# Patient Record
Sex: FEMALE | Race: WHITE | NOT HISPANIC OR LATINO | Employment: OTHER | URBAN - METROPOLITAN AREA
[De-identification: names, ages, dates, MRNs, and addresses within clinical notes are randomized per-mention and may not be internally consistent; named-entity substitution may affect disease eponyms.]

---

## 2022-03-23 PROBLEM — R29.6 RECURRENT FALLS: Status: ACTIVE | Noted: 2022-03-23

## 2022-06-14 PROBLEM — K40.90 LEFT INGUINAL HERNIA: Status: ACTIVE | Noted: 2022-01-01

## 2023-01-01 ENCOUNTER — OFFICE VISIT (OUTPATIENT)
Dept: CARDIOLOGY | Facility: CLINIC | Age: 88
End: 2023-01-01
Payer: MEDICARE

## 2023-01-01 ENCOUNTER — CLINICAL SUPPORT (OUTPATIENT)
Dept: CARDIOLOGY | Facility: HOSPITAL | Age: 88
End: 2023-01-01
Payer: MEDICARE

## 2023-01-01 ENCOUNTER — OFFICE VISIT (OUTPATIENT)
Dept: GASTROENTEROLOGY | Facility: CLINIC | Age: 88
End: 2023-01-01
Payer: MEDICARE

## 2023-01-01 ENCOUNTER — TELEPHONE (OUTPATIENT)
Dept: CARDIOLOGY | Facility: CLINIC | Age: 88
End: 2023-01-01
Payer: MEDICARE

## 2023-01-01 ENCOUNTER — PATIENT MESSAGE (OUTPATIENT)
Dept: CARDIOLOGY | Facility: CLINIC | Age: 88
End: 2023-01-01
Payer: MEDICARE

## 2023-01-01 VITALS — WEIGHT: 131.38 LBS | HEIGHT: 66 IN | BODY MASS INDEX: 21.11 KG/M2

## 2023-01-01 VITALS
HEIGHT: 66 IN | SYSTOLIC BLOOD PRESSURE: 111 MMHG | DIASTOLIC BLOOD PRESSURE: 71 MMHG | BODY MASS INDEX: 20.97 KG/M2 | WEIGHT: 130.5 LBS | HEART RATE: 68 BPM

## 2023-01-01 VITALS
SYSTOLIC BLOOD PRESSURE: 119 MMHG | BODY MASS INDEX: 20.13 KG/M2 | WEIGHT: 125.25 LBS | HEART RATE: 70 BPM | DIASTOLIC BLOOD PRESSURE: 80 MMHG | HEIGHT: 66 IN

## 2023-01-01 DIAGNOSIS — R93.3 ABNORMAL BARIUM SWALLOW: ICD-10-CM

## 2023-01-01 DIAGNOSIS — I27.20 PULMONARY HTN: ICD-10-CM

## 2023-01-01 DIAGNOSIS — Z79.01 CURRENT USE OF ANTICOAGULANT THERAPY: ICD-10-CM

## 2023-01-01 DIAGNOSIS — K21.9 GASTROESOPHAGEAL REFLUX DISEASE WITHOUT ESOPHAGITIS: ICD-10-CM

## 2023-01-01 DIAGNOSIS — F03.90 DEMENTIA, UNSPECIFIED DEMENTIA SEVERITY, UNSPECIFIED DEMENTIA TYPE, UNSPECIFIED WHETHER BEHAVIORAL, PSYCHOTIC, OR MOOD DISTURBANCE OR ANXIETY: ICD-10-CM

## 2023-01-01 DIAGNOSIS — R13.19 ESOPHAGEAL DYSPHAGIA: Primary | ICD-10-CM

## 2023-01-01 DIAGNOSIS — I48.91 ATRIAL FIBRILLATION WITH RAPID VENTRICULAR RESPONSE: Primary | ICD-10-CM

## 2023-01-01 DIAGNOSIS — I34.0 MITRAL VALVE INSUFFICIENCY, UNSPECIFIED ETIOLOGY: ICD-10-CM

## 2023-01-01 DIAGNOSIS — I34.1 MITRAL VALVE PROLAPSE: ICD-10-CM

## 2023-01-01 DIAGNOSIS — D69.6 THROMBOCYTOPENIA, UNSPECIFIED: ICD-10-CM

## 2023-01-01 DIAGNOSIS — R29.6 RECURRENT FALLS: ICD-10-CM

## 2023-01-01 DIAGNOSIS — I48.91 ATRIAL FIBRILLATION WITH RAPID VENTRICULAR RESPONSE: ICD-10-CM

## 2023-01-01 LAB
OHS CV EVENT MONITOR DAY: 0
OHS CV HOLTER LENGTH DECIMAL HOURS: 13.5
OHS CV HOLTER LENGTH HOURS: 13
OHS CV HOLTER LENGTH MINUTES: 30
OHS CV HOLTER SINUS AVERAGE HR: 77
OHS CV HOLTER SINUS MAX HR: 66
OHS CV HOLTER SINUS MIN HR: 61

## 2023-01-01 PROCEDURE — 3288F PR FALLS RISK ASSESSMENT DOCUMENTED: ICD-10-PCS | Mod: CPTII,S$GLB,, | Performed by: NURSE PRACTITIONER

## 2023-01-01 PROCEDURE — 3288F FALL RISK ASSESSMENT DOCD: CPT | Mod: CPTII,S$GLB,,

## 2023-01-01 PROCEDURE — 1100F PR PT FALLS ASSESS DOC 2+ FALLS/FALL W/INJURY/YR: ICD-10-PCS | Mod: CPTII,S$GLB,, | Performed by: NURSE PRACTITIONER

## 2023-01-01 PROCEDURE — 1160F PR REVIEW ALL MEDS BY PRESCRIBER/CLIN PHARMACIST DOCUMENTED: ICD-10-PCS | Mod: CPTII,S$GLB,, | Performed by: INTERNAL MEDICINE

## 2023-01-01 PROCEDURE — 1159F PR MEDICATION LIST DOCUMENTED IN MEDICAL RECORD: ICD-10-PCS | Mod: CPTII,S$GLB,,

## 2023-01-01 PROCEDURE — 1126F AMNT PAIN NOTED NONE PRSNT: CPT | Mod: CPTII,S$GLB,,

## 2023-01-01 PROCEDURE — 1126F AMNT PAIN NOTED NONE PRSNT: CPT | Mod: CPTII,S$GLB,, | Performed by: INTERNAL MEDICINE

## 2023-01-01 PROCEDURE — 99214 PR OFFICE/OUTPT VISIT, EST, LEVL IV, 30-39 MIN: ICD-10-PCS | Mod: S$GLB,,, | Performed by: INTERNAL MEDICINE

## 2023-01-01 PROCEDURE — 1111F DSCHRG MED/CURRENT MED MERGE: CPT | Mod: CPTII,S$GLB,,

## 2023-01-01 PROCEDURE — 93244 EXT ECG>48HR<7D REV&INTERPJ: CPT | Mod: ,,, | Performed by: INTERNAL MEDICINE

## 2023-01-01 PROCEDURE — 99214 PR OFFICE/OUTPT VISIT, EST, LEVL IV, 30-39 MIN: ICD-10-PCS | Mod: S$GLB,,, | Performed by: NURSE PRACTITIONER

## 2023-01-01 PROCEDURE — 99214 OFFICE O/P EST MOD 30 MIN: CPT | Mod: S$GLB,,, | Performed by: INTERNAL MEDICINE

## 2023-01-01 PROCEDURE — 1160F RVW MEDS BY RX/DR IN RCRD: CPT | Mod: CPTII,S$GLB,, | Performed by: NURSE PRACTITIONER

## 2023-01-01 PROCEDURE — 1126F PR PAIN SEVERITY QUANTIFIED, NO PAIN PRESENT: ICD-10-PCS | Mod: CPTII,S$GLB,, | Performed by: INTERNAL MEDICINE

## 2023-01-01 PROCEDURE — 1100F PTFALLS ASSESS-DOCD GE2>/YR: CPT | Mod: CPTII,S$GLB,, | Performed by: NURSE PRACTITIONER

## 2023-01-01 PROCEDURE — 93242 EXT ECG>48HR<7D RECORDING: CPT | Mod: PO

## 2023-01-01 PROCEDURE — 1159F PR MEDICATION LIST DOCUMENTED IN MEDICAL RECORD: ICD-10-PCS | Mod: CPTII,S$GLB,, | Performed by: INTERNAL MEDICINE

## 2023-01-01 PROCEDURE — 1126F PR PAIN SEVERITY QUANTIFIED, NO PAIN PRESENT: ICD-10-PCS | Mod: CPTII,S$GLB,,

## 2023-01-01 PROCEDURE — 99999 PR PBB SHADOW E&M-EST. PATIENT-LVL III: CPT | Mod: PBBFAC,,,

## 2023-01-01 PROCEDURE — 1100F PTFALLS ASSESS-DOCD GE2>/YR: CPT | Mod: CPTII,S$GLB,, | Performed by: INTERNAL MEDICINE

## 2023-01-01 PROCEDURE — 1100F PR PT FALLS ASSESS DOC 2+ FALLS/FALL W/INJURY/YR: ICD-10-PCS | Mod: CPTII,S$GLB,,

## 2023-01-01 PROCEDURE — 99999 PR PBB SHADOW E&M-EST. PATIENT-LVL III: ICD-10-PCS | Mod: PBBFAC,,, | Performed by: INTERNAL MEDICINE

## 2023-01-01 PROCEDURE — 1111F PR DISCHARGE MEDS RECONCILED W/ CURRENT OUTPATIENT MED LIST: ICD-10-PCS | Mod: CPTII,S$GLB,,

## 2023-01-01 PROCEDURE — 1159F PR MEDICATION LIST DOCUMENTED IN MEDICAL RECORD: ICD-10-PCS | Mod: CPTII,S$GLB,, | Performed by: NURSE PRACTITIONER

## 2023-01-01 PROCEDURE — 99214 OFFICE O/P EST MOD 30 MIN: CPT | Mod: S$GLB,,,

## 2023-01-01 PROCEDURE — 99214 PR OFFICE/OUTPT VISIT, EST, LEVL IV, 30-39 MIN: ICD-10-PCS | Mod: S$GLB,,,

## 2023-01-01 PROCEDURE — 1159F MED LIST DOCD IN RCRD: CPT | Mod: CPTII,S$GLB,, | Performed by: INTERNAL MEDICINE

## 2023-01-01 PROCEDURE — 3288F PR FALLS RISK ASSESSMENT DOCUMENTED: ICD-10-PCS | Mod: CPTII,S$GLB,, | Performed by: INTERNAL MEDICINE

## 2023-01-01 PROCEDURE — 1160F RVW MEDS BY RX/DR IN RCRD: CPT | Mod: CPTII,S$GLB,, | Performed by: INTERNAL MEDICINE

## 2023-01-01 PROCEDURE — 1160F PR REVIEW ALL MEDS BY PRESCRIBER/CLIN PHARMACIST DOCUMENTED: ICD-10-PCS | Mod: CPTII,S$GLB,, | Performed by: NURSE PRACTITIONER

## 2023-01-01 PROCEDURE — 3288F FALL RISK ASSESSMENT DOCD: CPT | Mod: CPTII,S$GLB,, | Performed by: NURSE PRACTITIONER

## 2023-01-01 PROCEDURE — 93244 HOLTER MONITOR - 72 HOUR: ICD-10-PCS | Mod: ,,, | Performed by: INTERNAL MEDICINE

## 2023-01-01 PROCEDURE — 99999 PR PBB SHADOW E&M-EST. PATIENT-LVL III: CPT | Mod: PBBFAC,,, | Performed by: INTERNAL MEDICINE

## 2023-01-01 PROCEDURE — 99999 PR PBB SHADOW E&M-EST. PATIENT-LVL III: ICD-10-PCS | Mod: PBBFAC,,,

## 2023-01-01 PROCEDURE — 99999 PR PBB SHADOW E&M-EST. PATIENT-LVL III: ICD-10-PCS | Mod: PBBFAC,,, | Performed by: NURSE PRACTITIONER

## 2023-01-01 PROCEDURE — 3288F PR FALLS RISK ASSESSMENT DOCUMENTED: ICD-10-PCS | Mod: CPTII,S$GLB,,

## 2023-01-01 PROCEDURE — 99999 PR PBB SHADOW E&M-EST. PATIENT-LVL III: CPT | Mod: PBBFAC,,, | Performed by: NURSE PRACTITIONER

## 2023-01-01 PROCEDURE — 1100F PTFALLS ASSESS-DOCD GE2>/YR: CPT | Mod: CPTII,S$GLB,,

## 2023-01-01 PROCEDURE — 1159F MED LIST DOCD IN RCRD: CPT | Mod: CPTII,S$GLB,,

## 2023-01-01 PROCEDURE — 99214 OFFICE O/P EST MOD 30 MIN: CPT | Mod: S$GLB,,, | Performed by: NURSE PRACTITIONER

## 2023-01-01 PROCEDURE — 1159F MED LIST DOCD IN RCRD: CPT | Mod: CPTII,S$GLB,, | Performed by: NURSE PRACTITIONER

## 2023-01-01 PROCEDURE — 3288F FALL RISK ASSESSMENT DOCD: CPT | Mod: CPTII,S$GLB,, | Performed by: INTERNAL MEDICINE

## 2023-01-01 PROCEDURE — 1100F PR PT FALLS ASSESS DOC 2+ FALLS/FALL W/INJURY/YR: ICD-10-PCS | Mod: CPTII,S$GLB,, | Performed by: INTERNAL MEDICINE

## 2023-01-01 RX ORDER — SUCRALFATE 1 G/1
1 TABLET ORAL 4 TIMES DAILY
Qty: 120 TABLET | Refills: 0 | Status: SHIPPED | OUTPATIENT
Start: 2023-01-01 | End: 2023-01-01

## 2023-01-01 RX ORDER — OMEPRAZOLE 40 MG/1
CAPSULE, DELAYED RELEASE ORAL
COMMUNITY
Start: 2023-01-01 | End: 2023-01-01 | Stop reason: SDUPTHER

## 2023-01-06 PROBLEM — D69.6 THROMBOCYTOPENIA, UNSPECIFIED: Status: ACTIVE | Noted: 2023-01-01

## 2023-02-10 PROBLEM — F03.90 DEMENTIA: Status: ACTIVE | Noted: 2023-01-01

## 2023-02-10 PROBLEM — I48.91 NEW ONSET A-FIB: Status: ACTIVE | Noted: 2023-01-01

## 2023-02-14 NOTE — TELEPHONE ENCOUNTER
----- Message from Etelvina Fisher sent at 2/14/2023 12:07 PM CST -----  Regarding: hospital f/u  Contact: [facility]  Type:  Sooner Appointment Request    Caller is requesting a sooner appointment.  Caller declined first available appointment listed below.  Caller will not accept being placed on the waitlist and is requesting a message be sent to doctor.    Name of Caller:  [facility]  When is the first available appointment?    Symptoms:  [facility] 2/14/23 dx: fib     Best Call Back Number:  [phone] (    Additional Information:  Patient needs to be seen within 1 week. Thanks!

## 2023-02-22 PROBLEM — I34.1 MITRAL VALVE PROLAPSE: Status: ACTIVE | Noted: 2023-01-01

## 2023-02-22 PROBLEM — I34.0 MITRAL VALVE REGURGITATION: Status: ACTIVE | Noted: 2023-01-01

## 2023-02-22 PROBLEM — I27.20 PULMONARY HTN: Status: ACTIVE | Noted: 2023-01-01

## 2023-02-22 NOTE — PROGRESS NOTES
Subjective:    Patient ID:  Rebecca Garcia is a 89 y.o. female patient here for evaluation Atrial Fibrillation      History of Present Illness:     Mrs. Fernandez is a 89 year old F who saw Dr. Lindsay in the Hospital here today for a follow up. She was converted with amiodarone to NSR after being found in RVR. No bleeding tendencies on Eliquis 2.5 mg BID.   NO chest pain, SOB, dizziness, falls, syncope, arm/neck/jaw pain. Her daughter has been given her a Pepcid complete as she has been having some trouble swallowing, has been gassy, and has had left upper quadrant pain reproducible to palpation. She is following up with her PCP Monday and will discuss treatment plans for GERD.       Review of patient's allergies indicates:  No Known Allergies    Past Medical History:   Diagnosis Date    Anticoagulant long-term use     Dementia     Macular degeneration      Past Surgical History:   Procedure Laterality Date    FOOT FRACTURE SURGERY      ROBOT-ASSISTED LAPAROSCOPIC REPAIR OF INGUINAL HERNIA USING DA ANDRE XI Left 6/14/2022    Procedure: XI ROBOTIC REPAIR, HERNIA, INGUINAL;  Surgeon: Kumar Davey MD;  Location: [facility];  Service: General;  Laterality: Left;     Social History     Tobacco Use    Smoking status: Never    Smokeless tobacco: Never   Substance Use Topics    Alcohol use: Not Currently    Drug use: Never        REVIEW OF SYSTEMS: As noted in HPI   CARDIOVASCULAR: No recent chest pain, palpitations, arm, neck, or jaw pain  RESPIRATORY: No recent fever, cough chills, SOB or congestion  : No blood in the urine  GI: No Nausea, vomiting, constipation, diarrhea, blood, or reflux.  MUSCULOSKELETAL: No myalgias  NEURO: No lightheadedness or dizziness  EYES: No Double vision, blurry, vision or headache        Objective        Vitals:    02/22/23 1403   BP: 119/80   Pulse: 70       LIPIDS - LAST 2   No results found for: CHOL, HDL, LDLCALC, TRIG, CHOLHDL    CBC - LAST 2  Lab Results   Component Value Date    WBC  6.46 02/11/2023    WBC 6.70 02/10/2023    RBC 3.46 (L) 02/11/2023    RBC 3.71 (L) 02/10/2023    HGB 10.7 (L) 02/11/2023    HGB 11.3 (L) 02/10/2023    HCT 32.0 (L) 02/11/2023    HCT 34.7 (L) 02/10/2023    MCV 93 02/11/2023    MCV 94 02/10/2023    MCH 30.9 02/11/2023    MCH 30.5 02/10/2023    MCHC 33.4 02/11/2023    MCHC 32.6 02/10/2023    RDW 14.8 (H) 02/11/2023    RDW 14.8 (H) 02/10/2023     (L) 02/11/2023     (L) 02/10/2023    MPV 11.8 02/11/2023    MPV 12.1 02/10/2023    GRAN 4.6 02/11/2023    GRAN 70.7 02/11/2023    LYMPH 1.2 02/11/2023    LYMPH 18.7 02/11/2023    MONO 0.5 02/11/2023    MONO 8.2 02/11/2023    BASO 0.06 02/11/2023    BASO 0.06 02/10/2023    NRBC 0 02/11/2023    NRBC 0 02/10/2023       CHEMISTRY & LIVER FUNCTION - LAST 2  Lab Results   Component Value Date     02/11/2023     02/10/2023    K 3.6 02/11/2023    K 3.9 02/10/2023     02/11/2023     02/10/2023    CO2 29 02/11/2023    CO2 26 02/10/2023    ANIONGAP 6 (L) 02/11/2023    ANIONGAP 6 (L) 02/10/2023    BUN 14 02/11/2023    BUN 12 02/10/2023    CREATININE 0.74 02/11/2023    CREATININE 0.68 02/10/2023     02/11/2023     02/10/2023    CALCIUM 8.6 02/11/2023    CALCIUM 9.0 02/10/2023    MG 1.9 02/10/2023    ALBUMIN 3.8 02/10/2023    ALBUMIN 4.0 10/27/2022    PROT 6.5 02/10/2023    PROT 7.0 10/27/2022    ALKPHOS 106 02/10/2023    ALKPHOS 100 10/27/2022    ALT 29 02/10/2023    ALT 15 10/27/2022    AST 39 (H) 02/10/2023    AST 33 10/27/2022    BILITOT 1.0 02/10/2023    BILITOT 0.6 10/27/2022        CARDIAC PROFILE - LAST 2  Lab Results   Component Value Date    TROPONINI 0.021 02/10/2023    TROPONINI 0.020 02/10/2023        COAGULATION - LAST 2  No results found for: LABPT, INR, APTT    ENDOCRINE & PSA - LAST 2  Lab Results   Component Value Date    TSH 2.550 10/27/2022    TSH 3.990 01/14/2022        ECHOCARDIOGRAM RESULTS  Results for orders placed during the hospital encounter of  02/10/23    Echo    Interpretation Summary  · The left ventricle is normal in size with concentric hypertrophy and normal systolic function.  · The estimated ejection fraction is 65%.  · Normal right ventricular size with normal right ventricular systolic function.  · Moderate left atrial enlargement.  · Mild right atrial enlargement.  · Mild aortic regurgitation.  · Severe mitral regurgitation.  · There is severe mitral prolapse of the posterior mitral leaflet.  · Mild tricuspid regurgitation.  · Elevated central venous pressure (15 mmHg).  · The estimated PA systolic pressure is 45 mmHg.  · There is pulmonary hypertension.      CURRENT/PREVIOUS VISIT EKG  Results for orders placed or performed during the hospital encounter of 02/10/23   EKG 12-lead    Collection Time: 02/10/23  6:02 PM    Narrative    Test Reason : I49.9,    Vent. Rate : 063 BPM     Atrial Rate : 063 BPM     P-R Int : 146 ms          QRS Dur : 086 ms      QT Int : 474 ms       P-R-T Axes : 014 022 002 degrees     QTc Int : 485 ms    Normal sinus rhythm  T wave abnormality, consider anterior ischemia  Abnormal ECG  When compared with ECG of 10-FEB-2023 11:54,  Sinus rhythm has replaced Atrial fibrillation  Vent. rate has decreased BY  67 BPM  Left posterior fascicular block is no longer Present  Non-specific change in ST segment in Lateral leads  T wave inversion now evident in Inferior leads  Nonspecific T wave abnormality no longer evident in Lateral leads  Confirmed by Claudio Massey ([id number]) on 2/14/2023 7:35:57 PM    Referred By: SUZANNA   SELF           Confirmed By:Claudio Massey       PHYSICAL EXAM  CONSTITUTIONAL: Well built, well nourished in no apparent distress  NECK: no carotid bruit, no JVD  LUNGS: CTA  CHEST WALL: no tenderness  HEART: regular rate and rhythm, blowing holosystolic murmur grade 4/6   ABDOMEN: soft, + LUQ tenderness to light palpation bowel sounds normal; no masses,  no organomegaly  EXTREMITIES: Extremities  normal, no edema, no calf tenderness noted  NEURO: AAO X 3    I HAVE REVIEWED :    The vital signs, nurses notes, and all the pertinent radiology and labs.    Current Outpatient Medications   Medication Instructions    amiodarone (PACERONE) 200 mg, Oral, Daily    apixaban (ELIQUIS) 2.5 mg, Oral, 2 times daily    aspirin (ECOTRIN) 81 mg, Oral, Daily    atorvastatin (LIPITOR) 10 mg, Oral, Daily    donepeziL (ARICEPT) 10 mg, Oral, Nightly    levocetirizine (XYZAL) 5 mg, Oral, Nightly    mirtazapine (REMERON) 30 mg, Oral, Nightly    traZODone (DESYREL) 50 mg, Oral, Nightly    vitamins  A,C,E-zinc-copper (PRESERVISION AREDS) [phone] unit-mg-unit Cap 2 capsules, Oral, Daily    walker (ULTRA-LIGHT ROLLATOR) Misc 1 each, Misc.(Non-Drug; Combo Route), Daily, Use Rollator walker for ambulation due to Dx gait instability        Assessment & Plan     Atrial fibrillation with rapid ventricular response  Holter to assess for any further PAF   Continue amio and eliquis   Discussed possibility of [facility] and they are to take home brochure and discuss with Dr. Lindsay at next visit       Mitral valve regurgitation  As seen on echo -- severe with pEF 65%  Seemingly asymptomatic in nature, limited by dementia     Mitral valve prolapse  As seen on echo 2/2023     Pulmonary HTN  PASP 45 on echo 2/2023         F/U 1 mo Dr. Lindsay

## 2023-02-22 NOTE — ASSESSMENT & PLAN NOTE
Holter to assess for any further PAF   Continue amio and eliquis   Discussed possibility of [facility] and they are to take home brochure and discuss with Dr. Lindsay at next visit

## 2023-03-06 PROBLEM — E87.6 HYPOKALEMIA: Status: ACTIVE | Noted: 2023-01-01

## 2023-03-06 PROBLEM — R13.19 OTHER DYSPHAGIA: Status: ACTIVE | Noted: 2023-01-01

## 2023-03-06 PROBLEM — R79.89 TROPONIN I ABOVE REFERENCE RANGE: Status: ACTIVE | Noted: 2023-01-01

## 2023-03-06 PROBLEM — Z71.89 ACP (ADVANCE CARE PLANNING): Status: ACTIVE | Noted: 2023-01-01

## 2023-03-06 PROBLEM — E87.1 HYPONATREMIA: Status: ACTIVE | Noted: 2023-01-01

## 2023-03-07 PROBLEM — R79.89 TROPONIN I ABOVE REFERENCE RANGE: Status: RESOLVED | Noted: 2023-01-01 | Resolved: 2023-01-01

## 2023-03-07 PROBLEM — Z71.89 ACP (ADVANCE CARE PLANNING): Status: RESOLVED | Noted: 2023-01-01 | Resolved: 2023-01-01

## 2023-03-07 PROBLEM — E87.6 HYPOKALEMIA: Status: RESOLVED | Noted: 2023-01-01 | Resolved: 2023-01-01

## 2023-03-29 NOTE — PROGRESS NOTES
"Subjective:       Patient ID: Rebecca Garcia is a 89 y.o. female, Body mass index is 21.21 kg/m².    Chief Complaint: Dysphagia      Patient is new to me. Established patient of Dr. Pond.  Referred by Dr. Xiong for esophageal dysphagia.     Reviewed hospital discharge summary from 3/9/23: "Hospital Course: 89-year-old female with history of dementia, AFib initially presented to the emergency department with what appeared to be worsening dysphagia.  Patient was placed in observation with consultation to Gastroenterology.  Patient underwent EGD which revealed normal esophagus status post biopsy as well as normal stomach and normal duodenum.  Patient tolerated the procedure well.  Diet was advanced postoperatively.  She is stable for discharge home today with close outpatient PCP follow-up.  Please see chart for full details."    Here with daughter, whom assisted with interview.     Gastroesophageal Reflux  She complains of abdominal pain (LUQ region; started on arrival to appointment; improved after bowel movement), belching, dysphagia (dysphagia to solids and liquids; pounds on her chest to get food down; associated with shortness of breath) and heartburn. She reports no chest pain, no choking, no coughing, no early satiety, no globus sensation, no hoarse voice, no nausea, no sore throat, no stridor, no tooth decay, no water brash or no wheezing. This is a new problem. The current episode started 1 to 4 weeks ago. The problem occurs frequently. The problem has been unchanged. The heartburn is located in the substernum. The heartburn is of moderate intensity. The heartburn does not wake her from sleep. The heartburn limits her activity. The heartburn doesn't change with position. Exacerbated by: eating. Associated symptoms include anemia. Pertinent negatives include no melena or weight loss. Risk factors include lack of exercise (dementia). She has tried a PPI (Currently: Recently started Omeprazole 40 mg BID- " "no improvement) for the symptoms. Past procedures include an EGD (EGD done 3/8/23 normal). Modified barium swallow speech study done 3/22/23 showed "evidence of residue throughout the esophagus".   Review of Systems   Constitutional:  Negative for appetite change, fever, unexpected weight change and weight loss.   HENT:  Positive for trouble swallowing. Negative for hoarse voice and sore throat.    Respiratory:  Positive for shortness of breath. Negative for cough, choking and wheezing.    Cardiovascular:  Negative for chest pain.   Gastrointestinal:  Positive for abdominal pain (LUQ region; started on arrival to appointment; improved after bowel movement), dysphagia (dysphagia to solids and liquids; pounds on her chest to get food down; associated with shortness of breath) and heartburn. Negative for abdominal distention, anal bleeding, blood in stool, constipation, diarrhea, melena, nausea, rectal pain and vomiting.   Genitourinary:  Negative for difficulty urinating and dysuria.   Musculoskeletal:  Positive for gait problem.   Skin:  Negative for rash.   Neurological:  Negative for speech difficulty.   Psychiatric/Behavioral:  Positive for confusion.      Past Medical History:   Diagnosis Date    Anticoagulant long-term use     Dementia     Macular degeneration       Past Surgical History:   Procedure Laterality Date    ESOPHAGOGASTRODUODENOSCOPY N/A 3/8/2023    Procedure: EGD (ESOPHAGOGASTRODUODENOSCOPY);  Surgeon: Isaac Pond MD;  Location: [facility];  Service: Endoscopy;  Laterality: N/A;    FOOT FRACTURE SURGERY      ROBOT-ASSISTED LAPAROSCOPIC REPAIR OF INGUINAL HERNIA USING DA ANDRE XI Left 6/14/2022    Procedure: XI ROBOTIC REPAIR, HERNIA, INGUINAL;  Surgeon: Kumar Davey MD;  Location: [facility] OR;  Service: General;  Laterality: Left;      Family History   Family history unknown: Yes      Wt Readings from Last 10 Encounters:   03/29/23 59.6 kg (131 lb 6.3 oz)   03/21/23 58 kg (127 lb 12.8 oz) "   03/06/23 59.1 kg (130 lb 4.7 oz)   02/27/23 57.7 kg (127 lb 1.6 oz)   02/22/23 56.8 kg (125 lb 3.5 oz)   02/20/23 56.3 kg (124 lb 1 oz)   02/14/23 54.3 kg (119 lb 11.4 oz)   02/10/23 59.4 kg (130 lb 14.4 oz)   01/06/23 58.5 kg (128 lb 14.4 oz)   10/27/22 60.5 kg (133 lb 4.8 oz)     Lab Results   Component Value Date    WBC 5.11 03/08/2023    HGB 10.2 (L) 03/08/2023    HCT 30.9 (L) 03/08/2023    MCV 89 03/08/2023     (L) 03/08/2023     CMP  Sodium   Date Value Ref Range Status   03/08/2023 128 (L) 136 - 145 mmol/L Final     Potassium   Date Value Ref Range Status   03/08/2023 3.8 3.5 - 5.1 mmol/L Final     Chloride   Date Value Ref Range Status   03/08/2023 92 (L) 95 - 110 mmol/L Final     CO2   Date Value Ref Range Status   03/08/2023 30 22 - 31 mmol/L Final     Glucose   Date Value Ref Range Status   03/08/2023 100 70 - 110 mg/dL Final     Comment:     The ADA recommends the following guidelines for fasting glucose:    Normal:       less than 100 mg/dL    Prediabetes:  100 mg/dL to 125 mg/dL    Diabetes:     126 mg/dL or higher       BUN   Date Value Ref Range Status   03/08/2023 15 7 - 18 mg/dL Final     Creatinine   Date Value Ref Range Status   03/08/2023 0.69 0.50 - 1.40 mg/dL Final     Calcium   Date Value Ref Range Status   03/08/2023 8.5 8.4 - 10.2 mg/dL Final     Total Protein   Date Value Ref Range Status   03/06/2023 6.6 6.0 - 8.4 g/dL Final     Albumin   Date Value Ref Range Status   03/06/2023 3.9 3.5 - 5.2 g/dL Final     Total Bilirubin   Date Value Ref Range Status   03/06/2023 1.2 0.2 - 1.3 mg/dL Final     Alkaline Phosphatase   Date Value Ref Range Status   03/06/2023 112 38 - 145 U/L Final     AST   Date Value Ref Range Status   03/06/2023 50 (H) 14 - 36 U/L Final     ALT   Date Value Ref Range Status   03/06/2023 41 (H) 0 - 35 U/L Final     Anion Gap   Date Value Ref Range Status   03/08/2023 6 (L) 8 - 16 mmol/L Final     eGFR if    Date Value Ref Range Status   06/14/2022  >60 >60 mL/min/1.73 m^2 Final     eGFR if non    Date Value Ref Range Status   06/14/2022 >60 >60 mL/min/1.73 m^2 Final     Comment:     Calculation used to obtain the estimated glomerular filtration  rate (eGFR) is the CKD-EPI equation.                 Reviewed prior medical records including radiology report of modified barium swallow speech study 3/22/23 and CT of abdomen and pelvis 10/27/22 & endoscopy history (see surgical history).     Objective:      Physical Exam  Constitutional:       General: She is not in acute distress.     Appearance: She is well-developed.   HENT:      Head: Normocephalic.      Right Ear: Hearing normal.      Left Ear: Hearing normal.      Nose: Nose normal.      Mouth/Throat:      Mouth: No oral lesions.      Pharynx: Uvula midline.   Eyes:      General: Lids are normal.      Conjunctiva/sclera: Conjunctivae normal.      Pupils: Pupils are equal, round, and reactive to light.   Neck:      Trachea: Trachea normal.   Cardiovascular:      Rate and Rhythm: Normal rate and regular rhythm.      Heart sounds: Murmur heard.   Pulmonary:      Effort: Pulmonary effort is normal. No respiratory distress.      Breath sounds: Normal breath sounds. No stridor. No wheezing.   Abdominal:      General: Bowel sounds are normal. There is no distension.      Palpations: Abdomen is soft. There is no mass.      Tenderness: There is no abdominal tenderness. There is no guarding or rebound.   Musculoskeletal:         General: Normal range of motion.      Cervical back: Normal range of motion.      Comments: Ambulates with walker   Skin:     General: Skin is warm and dry.      Findings: No rash.      Comments: Non jaundiced   Neurological:      Mental Status: She is alert. She is confused.   Psychiatric:         Speech: Speech normal.         Behavior: Behavior normal. Behavior is cooperative.         Assessment:       1. Esophageal dysphagia    2. Gastroesophageal reflux disease without  esophagitis    3. Abnormal barium swallow    4. Current use of anticoagulant therapy    5. Dementia, unspecified dementia severity, unspecified dementia type, unspecified whether behavioral, psychotic, or mood disturbance or anxiety           Plan:   All diagnoses and orders for this visit:    Esophageal dysphagia & Abnormal barium swallow        - FL Esophagram Complete; Future; Expected date: 03/29/2023        - Educated patient to eat smaller more frequent meals and to eat slowly and advised to eat a soft diet.  - Possible esophageal manometry pending test results and/or if symptoms persist     Gastroesophageal reflux disease without esophagitis  - Continue Omeprazole 40 mg BID  - Start: sucralfate (CARAFATE) 1 gram tablet; Take 1 tablet (1 g total) by mouth 4 (four) times daily.  Dispense: 120 tablet; Refill: 0  - Take PPI in the morning 30 minutes before breakfast and dinner  - Recommend to avoid large meals, avoid eating within 3 hours of bedtime, elevate head of bed if nocturnal symptoms are present, smoking cessation (if current smoker), & weight loss (if overweight).   - Recommend minimize/avoid high-fat foods, chocolate, caffeine, citrus, alcohol, & tomato products.  - Advised to avoid/limit use of NSAID's, since they can cause GI upset, bleeding, and/or ulcers. If needed, take with food.      Current use of anticoagulant therapy    Dementia, unspecified dementia severity, unspecified dementia type, unspecified whether behavioral, psychotic, or mood disturbance or anxiety   - Recommend follow-up with PCP for continued evaluation and management     If no improvement in symptoms or symptoms worsen, call/follow-up at clinic or go to ER

## 2023-04-06 NOTE — PROGRESS NOTES
Subjective:    Patient ID:  Rebecca Garcia is a 89 y.o. female patient here for evaluation Atrial Fibrillation      History of Present Illness:  Cardiology follow-up, valvular heart disease, congestive heart failure.  Extra atrial fibrillation.  Since converted to normal sinus rhythm on amiodarone, remains on oral anticoagulation with apixaban.    Follow-up labs stable, recent abdominal pelvic CT with incidental finding of bilateral pleural effusions small pericardial effusion.    Remains symptomatic with PAZ, occasional PND orthopnea.  Lower extremity edema.  No angina chest pain no syncope.     No prior history of CVA Ca.  No DVT PE.  No known chronic lung disease.  No known chronic kidney disease.  No liver disease.        Review of patient's allergies indicates:  No Known Allergies    Past Medical History:   Diagnosis Date    Anticoagulant long-term use     Dementia     Macular degeneration      Past Surgical History:   Procedure Laterality Date    ESOPHAGOGASTRODUODENOSCOPY N/A 03/08/2023    Procedure: EGD (ESOPHAGOGASTRODUODENOSCOPY);  Surgeon: Isaac Pond MD;  Location: [facility];  Service: Endoscopy;  Laterality: N/A;    FOOT FRACTURE SURGERY      HERNIA REPAIR  06/22    ROBOT-ASSISTED LAPAROSCOPIC REPAIR OF INGUINAL HERNIA USING DA ANDRE XI Left 06/14/2022    Procedure: XI ROBOTIC REPAIR, HERNIA, INGUINAL;  Surgeon: Kumar Davey MD;  Location: [facility] OR;  Service: General;  Laterality: Left;     Social History     Tobacco Use    Smoking status: Never    Smokeless tobacco: Never   Substance Use Topics    Alcohol use: Never    Drug use: Never        Review of Systems:    As noted in HPI in addition      REVIEW OF SYSTEMS  Review of Systems   Constitutional: Negative for decreased appetite, diaphoresis, night sweats, weight gain and weight loss.   HENT:  Negative for nosebleeds and odynophagia.    Eyes:  Negative for double vision and photophobia.   Cardiovascular:  Positive for dyspnea on exertion,  irregular heartbeat and orthopnea. Negative for chest pain, claudication, cyanosis, leg swelling, near-syncope, palpitations, paroxysmal nocturnal dyspnea and syncope.   Respiratory:  Positive for shortness of breath. Negative for cough, hemoptysis and wheezing.    Hematologic/Lymphatic: Negative for adenopathy.   Skin:  Negative for flushing, skin cancer and suspicious lesions.   Musculoskeletal:  Negative for gout, myalgias and neck pain.   Gastrointestinal:  Negative for abdominal pain, heartburn, hematemesis and hematochezia.   Genitourinary:  Negative for bladder incontinence, hesitancy and nocturia.   Neurological:  Negative for focal weakness, headaches, light-headedness and paresthesias.   Psychiatric/Behavioral:  Negative for memory loss and substance abuse.             Objective:        Vitals:    04/06/23 1117   BP: 111/71   Pulse: 68       Lab Results   Component Value Date    WBC 4.05 04/03/2023    HGB 10.7 (L) 04/03/2023    HCT 33.6 (L) 04/03/2023     04/03/2023    ALT 27 04/03/2023    AST 46 (H) 04/03/2023     (L) 04/03/2023    K 3.7 04/03/2023    CL 93 (L) 04/03/2023    CREATININE 0.67 04/03/2023    BUN 15 04/03/2023    CO2 32 (H) 04/03/2023    TSH 7.710 (H) 03/07/2023        ECHOCARDIOGRAM RESULTS  Results for orders placed during the hospital encounter of 02/10/23    Echo    Interpretation Summary  · The left ventricle is normal in size with concentric hypertrophy and normal systolic function.  · The estimated ejection fraction is 65%.  · Normal right ventricular size with normal right ventricular systolic function.  · Moderate left atrial enlargement.  · Mild right atrial enlargement.  · Mild aortic regurgitation.  · Severe mitral regurgitation.  · There is severe mitral prolapse of the posterior mitral leaflet.  · Mild tricuspid regurgitation.  · Elevated central venous pressure (15 mmHg).  · The estimated PA systolic pressure is 45 mmHg.  · There is pulmonary  hypertension.        CURRENT/PREVIOUS VISIT EKG  Results for orders placed or performed during the hospital encounter of 03/06/23   EKG 12-lead    Collection Time: 03/06/23 11:07 PM    Narrative    Test Reason : R77.8,    Vent. Rate : 069 BPM     Atrial Rate : 069 BPM     P-R Int : 178 ms          QRS Dur : 092 ms      QT Int : 536 ms       P-R-T Axes : 035 043 053 degrees     QTc Int : 574 ms    Sinus rhythm with occasional Premature ventricular complexes  T wave abnormality, consider anterior ischemia  Prolonged QT  Abnormal ECG  When compared with ECG of 06-MAR-2023 16:37,  Premature ventricular complexes are now Present  QT has lengthened  Confirmed by Jacob DRAKE, Sebastian CHA ([id number]) on 3/7/2023 9:45:40 AM    Referred By: ORTEGA ESCAMILLA           Confirmed By:Sebastian James MD     No valid procedures specified.   No results found for this or any previous visit.    No valid procedures specified.    PHYSICAL EXAM  CONSTITUTIONAL: Well built, well nourished in no apparent distress  NECK: no carotid bruit, no JVD  LUNGS: CTA  CHEST WALL: no tenderness,  HEART: regular rate and rhythm, S1, S2 normal, no murmur, click, rub or gallop   ABDOMEN: soft, non-tender; bowel sounds normal; no masses,  no organomegaly  EXTREMITIES: Extremities normal, no edema, no calf tenderness noted  NEURO: AAO X 3    I HAVE REVIEWED :    The vital signs, nurses notes, and all the pertinent radiology and labs.         Current Outpatient Medications   Medication Instructions    amiodarone (PACERONE) 200 mg, Oral, Daily    apixaban (ELIQUIS) 2.5 mg, Oral, 2 times daily    aspirin (ECOTRIN) 81 mg, Oral, Daily    atorvastatin (LIPITOR) 10 mg, Oral, Daily    donepeziL (ARICEPT) 10 mg, Oral, Nightly    furosemide (LASIX) 20 MG tablet Take 2 tablets (40 mg total) by mouth once daily for 3 days, THEN 1 tablet (20 mg total) once daily for 3 days. For leg swelling/shortness of breath.    levocetirizine (XYZAL) 5 mg, Oral, Nightly    mirtazapine  (REMERON) 30 mg, Oral, Nightly    omeprazole (PRILOSEC) 40 mg, Oral, 2 times daily before meals    potassium chloride 10% (KAYCIEL) 20 mEq/15 mL oral solution 20 mEq, Oral, Daily    sucralfate (CARAFATE) 1 g, Oral, 4 times daily    traZODone (DESYREL) 50 mg, Oral, Nightly    walker (ULTRA-LIGHT ROLLATOR) Misc 1 each, Misc.(Non-Drug; Combo Route), Daily, Use Rollator walker for ambulation due to Dx gait instability          Assessment:   Paroxysmal atrial fibrillation, currently normal sinus rhythm on amiodarone and apixaban.  Congestive heart failure, preserved ejection fraction, valvular heart disease with MVP and severe MR.  PA systolic hypertension.          Plan:     Continue amiodarone, apixaban.  Continue Lasix 40 mg daily daily weights, monitor lower extremity edema and weight changes, titrate Lasix accordingly.  Follow up in 3 months with labs.  Repeat limited echo to reassess mitral regurgitation now that patient has normal sinus rhythm.        No follow-ups on file.

## 2023-04-22 PROBLEM — J90 PLEURAL EFFUSION: Status: ACTIVE | Noted: 2023-01-01

## 2023-04-22 PROBLEM — I48.0 PAROXYSMAL ATRIAL FIBRILLATION: Status: ACTIVE | Noted: 2023-01-01

## 2023-04-22 PROBLEM — I50.43 ACUTE ON CHRONIC COMBINED SYSTOLIC AND DIASTOLIC HEART FAILURE: Status: ACTIVE | Noted: 2023-01-01
